# Patient Record
Sex: MALE | Race: WHITE
[De-identification: names, ages, dates, MRNs, and addresses within clinical notes are randomized per-mention and may not be internally consistent; named-entity substitution may affect disease eponyms.]

---

## 2018-03-19 ENCOUNTER — HOSPITAL ENCOUNTER (INPATIENT)
Dept: HOSPITAL 17 - NEPD | Age: 38
LOS: 1 days | Discharge: HOME | DRG: 514 | End: 2018-03-20
Attending: HOSPITALIST | Admitting: HOSPITALIST
Payer: COMMERCIAL

## 2018-03-19 VITALS
RESPIRATION RATE: 19 BRPM | OXYGEN SATURATION: 97 % | SYSTOLIC BLOOD PRESSURE: 148 MMHG | DIASTOLIC BLOOD PRESSURE: 91 MMHG | HEART RATE: 101 BPM | TEMPERATURE: 97.2 F

## 2018-03-19 VITALS
DIASTOLIC BLOOD PRESSURE: 98 MMHG | OXYGEN SATURATION: 98 % | TEMPERATURE: 97.7 F | HEART RATE: 95 BPM | SYSTOLIC BLOOD PRESSURE: 139 MMHG | RESPIRATION RATE: 19 BRPM

## 2018-03-19 VITALS
DIASTOLIC BLOOD PRESSURE: 118 MMHG | OXYGEN SATURATION: 98 % | SYSTOLIC BLOOD PRESSURE: 179 MMHG | HEART RATE: 70 BPM | RESPIRATION RATE: 18 BRPM | TEMPERATURE: 97.6 F

## 2018-03-19 VITALS
OXYGEN SATURATION: 99 % | TEMPERATURE: 98.2 F | SYSTOLIC BLOOD PRESSURE: 176 MMHG | DIASTOLIC BLOOD PRESSURE: 108 MMHG | RESPIRATION RATE: 18 BRPM | HEART RATE: 116 BPM

## 2018-03-19 VITALS
DIASTOLIC BLOOD PRESSURE: 103 MMHG | HEART RATE: 88 BPM | RESPIRATION RATE: 17 BRPM | SYSTOLIC BLOOD PRESSURE: 174 MMHG | TEMPERATURE: 97.4 F | OXYGEN SATURATION: 100 %

## 2018-03-19 VITALS — WEIGHT: 165.35 LBS | HEIGHT: 70 IN | BODY MASS INDEX: 23.67 KG/M2

## 2018-03-19 VITALS
OXYGEN SATURATION: 98 % | DIASTOLIC BLOOD PRESSURE: 96 MMHG | HEART RATE: 105 BPM | RESPIRATION RATE: 17 BRPM | TEMPERATURE: 97.4 F | SYSTOLIC BLOOD PRESSURE: 165 MMHG

## 2018-03-19 VITALS — HEART RATE: 100 BPM | RESPIRATION RATE: 18 BRPM | OXYGEN SATURATION: 97 %
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
## 2018-03-19 DIAGNOSIS — Z23: ICD-10-CM

## 2018-03-19 DIAGNOSIS — F12.90: ICD-10-CM

## 2018-03-19 DIAGNOSIS — I10: ICD-10-CM

## 2018-03-19 DIAGNOSIS — S62.306B: Primary | ICD-10-CM

## 2018-03-19 DIAGNOSIS — R73.9: ICD-10-CM

## 2018-03-19 DIAGNOSIS — R00.0: ICD-10-CM

## 2018-03-19 DIAGNOSIS — G89.29: ICD-10-CM

## 2018-03-19 DIAGNOSIS — M54.5: ICD-10-CM

## 2018-03-19 LAB
BASOPHILS # BLD AUTO: 0.1 TH/MM3 (ref 0–0.2)
BASOPHILS NFR BLD: 0.5 % (ref 0–2)
BUN SERPL-MCNC: 17 MG/DL (ref 7–18)
CALCIUM SERPL-MCNC: 8.4 MG/DL (ref 8.5–10.1)
CHLORIDE SERPL-SCNC: 108 MEQ/L (ref 98–107)
CREAT SERPL-MCNC: 0.93 MG/DL (ref 0.6–1.3)
EOSINOPHIL # BLD: 0.1 TH/MM3 (ref 0–0.4)
EOSINOPHIL NFR BLD: 0.9 % (ref 0–4)
ERYTHROCYTE [DISTWIDTH] IN BLOOD BY AUTOMATED COUNT: 13.9 % (ref 11.6–17.2)
GFR SERPLBLD BASED ON 1.73 SQ M-ARVRAT: 91 ML/MIN (ref 89–?)
GLUCOSE SERPL-MCNC: 91 MG/DL (ref 74–106)
HCO3 BLD-SCNC: 22.8 MEQ/L (ref 21–32)
HCT VFR BLD CALC: 42.4 % (ref 39–51)
HGB BLD-MCNC: 15.2 GM/DL (ref 13–17)
INR PPP: 1 RATIO
LYMPHOCYTES # BLD AUTO: 1.8 TH/MM3 (ref 1–4.8)
LYMPHOCYTES NFR BLD AUTO: 13.4 % (ref 9–44)
MCH RBC QN AUTO: 32.7 PG (ref 27–34)
MCHC RBC AUTO-ENTMCNC: 35.8 % (ref 32–36)
MCV RBC AUTO: 91.5 FL (ref 80–100)
MONOCYTE #: 0.8 TH/MM3 (ref 0–0.9)
MONOCYTES NFR BLD: 6 % (ref 0–8)
NEUTROPHILS # BLD AUTO: 10.4 TH/MM3 (ref 1.8–7.7)
NEUTROPHILS NFR BLD AUTO: 79.2 % (ref 16–70)
PLATELET # BLD: 210 TH/MM3 (ref 150–450)
PMV BLD AUTO: 9.2 FL (ref 7–11)
PROTHROMBIN TIME: 10.1 SEC (ref 9.8–11.6)
RBC # BLD AUTO: 4.63 MIL/MM3 (ref 4.5–5.9)
SODIUM SERPL-SCNC: 142 MEQ/L (ref 136–145)
WBC # BLD AUTO: 13.2 TH/MM3 (ref 4–11)

## 2018-03-19 PROCEDURE — 96365 THER/PROPH/DIAG IV INF INIT: CPT

## 2018-03-19 PROCEDURE — 0PSP04Z REPOSITION RIGHT METACARPAL WITH INTERNAL FIXATION DEVICE, OPEN APPROACH: ICD-10-PCS | Performed by: ORTHOPAEDIC SURGERY

## 2018-03-19 PROCEDURE — 96375 TX/PRO/DX INJ NEW DRUG ADDON: CPT

## 2018-03-19 PROCEDURE — 73120 X-RAY EXAM OF HAND: CPT

## 2018-03-19 PROCEDURE — 90471 IMMUNIZATION ADMIN: CPT

## 2018-03-19 PROCEDURE — 26605 TREAT METACARPAL FRACTURE: CPT

## 2018-03-19 PROCEDURE — 83036 HEMOGLOBIN GLYCOSYLATED A1C: CPT

## 2018-03-19 PROCEDURE — 12001 RPR S/N/AX/GEN/TRNK 2.5CM/<: CPT

## 2018-03-19 PROCEDURE — 0HQFXZZ REPAIR RIGHT HAND SKIN, EXTERNAL APPROACH: ICD-10-PCS

## 2018-03-19 PROCEDURE — C1713 ANCHOR/SCREW BN/BN,TIS/BN: HCPCS

## 2018-03-19 PROCEDURE — 85610 PROTHROMBIN TIME: CPT

## 2018-03-19 PROCEDURE — 0PSPXZZ REPOSITION RIGHT METACARPAL, EXTERNAL APPROACH: ICD-10-PCS

## 2018-03-19 PROCEDURE — 85025 COMPLETE CBC W/AUTO DIFF WBC: CPT

## 2018-03-19 PROCEDURE — 90714 TD VACC NO PRESV 7 YRS+ IM: CPT

## 2018-03-19 PROCEDURE — 85730 THROMBOPLASTIN TIME PARTIAL: CPT

## 2018-03-19 PROCEDURE — 73130 X-RAY EXAM OF HAND: CPT

## 2018-03-19 PROCEDURE — 80048 BASIC METABOLIC PNL TOTAL CA: CPT

## 2018-03-19 RX ADMIN — MORPHINE SULFATE PRN MG: 2 INJECTION, SOLUTION INTRAMUSCULAR; INTRAVENOUS at 21:23

## 2018-03-19 RX ADMIN — MORPHINE SULFATE PRN MG: 2 INJECTION, SOLUTION INTRAMUSCULAR; INTRAVENOUS at 06:29

## 2018-03-19 RX ADMIN — PHENYTOIN SODIUM SCH MLS/HR: 50 INJECTION INTRAMUSCULAR; INTRAVENOUS at 03:31

## 2018-03-19 RX ADMIN — Medication SCH ML: at 09:00

## 2018-03-19 RX ADMIN — PHENYTOIN SODIUM SCH MLS/HR: 50 INJECTION INTRAMUSCULAR; INTRAVENOUS at 13:13

## 2018-03-19 RX ADMIN — MORPHINE SULFATE PRN MG: 2 INJECTION, SOLUTION INTRAMUSCULAR; INTRAVENOUS at 14:56

## 2018-03-19 RX ADMIN — Medication SCH ML: at 21:27
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
## 2018-03-19 NOTE — RADRPT
EXAM DATE/TIME:  03/19/2018 03:24 

 

[facility] COMPARISON:     

HAND RIGHT COMPLETE ([id number]), March 19, 2018, 2:09.

 

                     

INDICATIONS :     

Post reduction 5th digit right hand.

                     

 

MEDICAL HISTORY :     

None.          

 

SURGICAL HISTORY :     

None.   

 

ENCOUNTER:     

Initial                                        

 

ACUITY:     

1 day      

 

PAIN SCORE:     

0/10

 

LOCATION:     

Bilateral  chest

 

FINDINGS:     

Three-view examination of the hand demonstrates a reduction in the amount of angulation of the 5th me
tacarpal fracture.  There is still mild palmar angulation the lateral view.

 

CONCLUSION:     

Reduction in the amount of angulation of the 5th metacarpal fracture.

 

 

 

 Jordin Jennings MD on March 19, 2018 at 3:49           

Board Certified Radiologist.

 This report was verified electronically.
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
## 2018-03-20 VITALS
OXYGEN SATURATION: 98 % | SYSTOLIC BLOOD PRESSURE: 137 MMHG | RESPIRATION RATE: 19 BRPM | HEART RATE: 88 BPM | DIASTOLIC BLOOD PRESSURE: 80 MMHG | TEMPERATURE: 95.9 F

## 2018-03-20 VITALS
SYSTOLIC BLOOD PRESSURE: 173 MMHG | TEMPERATURE: 97.1 F | OXYGEN SATURATION: 99 % | HEART RATE: 78 BPM | DIASTOLIC BLOOD PRESSURE: 113 MMHG | RESPIRATION RATE: 18 BRPM

## 2018-03-20 VITALS
TEMPERATURE: 97.9 F | SYSTOLIC BLOOD PRESSURE: 161 MMHG | HEART RATE: 108 BPM | DIASTOLIC BLOOD PRESSURE: 89 MMHG | RESPIRATION RATE: 17 BRPM | OXYGEN SATURATION: 94 %

## 2018-03-20 LAB
BASOPHILS # BLD AUTO: 0 TH/MM3 (ref 0–0.2)
BASOPHILS NFR BLD: 0.2 % (ref 0–2)
BUN SERPL-MCNC: 15 MG/DL (ref 7–18)
CALCIUM SERPL-MCNC: 8 MG/DL (ref 8.5–10.1)
CHLORIDE SERPL-SCNC: 105 MEQ/L (ref 98–107)
CREAT SERPL-MCNC: 0.93 MG/DL (ref 0.6–1.3)
EOSINOPHIL # BLD: 0 TH/MM3 (ref 0–0.4)
EOSINOPHIL NFR BLD: 0 % (ref 0–4)
ERYTHROCYTE [DISTWIDTH] IN BLOOD BY AUTOMATED COUNT: 14 % (ref 11.6–17.2)
GFR SERPLBLD BASED ON 1.73 SQ M-ARVRAT: 91 ML/MIN (ref 89–?)
GLUCOSE SERPL-MCNC: 198 MG/DL (ref 74–106)
HBA1C MFR BLD: 4.3 % (ref 4.3–6)
HCO3 BLD-SCNC: 25 MEQ/L (ref 21–32)
HCT VFR BLD CALC: 40.9 % (ref 39–51)
HGB BLD-MCNC: 14.9 GM/DL (ref 13–17)
LYMPHOCYTES # BLD AUTO: 0.5 TH/MM3 (ref 1–4.8)
LYMPHOCYTES NFR BLD AUTO: 3.9 % (ref 9–44)
MCH RBC QN AUTO: 33.4 PG (ref 27–34)
MCHC RBC AUTO-ENTMCNC: 36.4 % (ref 32–36)
MCV RBC AUTO: 91.5 FL (ref 80–100)
MONOCYTE #: 0.1 TH/MM3 (ref 0–0.9)
MONOCYTES NFR BLD: 0.9 % (ref 0–8)
NEUTROPHILS # BLD AUTO: 11.5 TH/MM3 (ref 1.8–7.7)
NEUTROPHILS NFR BLD AUTO: 95 % (ref 16–70)
PLATELET # BLD: 179 TH/MM3 (ref 150–450)
PMV BLD AUTO: 9.2 FL (ref 7–11)
RBC # BLD AUTO: 4.46 MIL/MM3 (ref 4.5–5.9)
SODIUM SERPL-SCNC: 138 MEQ/L (ref 136–145)
WBC # BLD AUTO: 12.1 TH/MM3 (ref 4–11)

## 2018-03-20 RX ADMIN — CEFAZOLIN SODIUM SCH MLS/HR: 1 POWDER, FOR SOLUTION INTRAMUSCULAR; INTRAVENOUS at 18:31

## 2018-03-20 RX ADMIN — HYDROCODONE BITARTRATE AND ACETAMINOPHEN PRN TAB: 10; 325 TABLET ORAL at 18:31

## 2018-03-20 RX ADMIN — MORPHINE SULFATE PRN MG: 2 INJECTION, SOLUTION INTRAMUSCULAR; INTRAVENOUS at 07:58

## 2018-03-20 RX ADMIN — Medication SCH ML: at 09:00

## 2018-03-20 RX ADMIN — PHENYTOIN SODIUM SCH MLS/HR: 50 INJECTION INTRAMUSCULAR; INTRAVENOUS at 02:12

## 2018-03-20 RX ADMIN — HYDROCODONE BITARTRATE AND ACETAMINOPHEN PRN TAB: 10; 325 TABLET ORAL at 14:58

## 2018-03-20 RX ADMIN — HYDROCODONE BITARTRATE AND ACETAMINOPHEN PRN TAB: 10; 325 TABLET ORAL at 11:08

## 2018-03-20 RX ADMIN — MORPHINE SULFATE PRN MG: 2 INJECTION, SOLUTION INTRAMUSCULAR; INTRAVENOUS at 02:09

## 2018-03-20 RX ADMIN — CEFAZOLIN SODIUM SCH MLS/HR: 1 POWDER, FOR SOLUTION INTRAMUSCULAR; INTRAVENOUS at 10:13

## 2018-03-20 NOTE — HHI.DCPOC
Discharge Care Plan


Diagnosis:  


(1) Open fracture of fifth metacarpal bone of right hand








Your Health Problems Are: Incision/Drains





 Exercise Tolerance








Goals to Promote Your Health


* To prevent worsening of your condition and complications


* To maintain your health at the optimal level


Directions to Meet Your Goals


*** Take your medications as prescribed


*** Follow your dietary instruction


*** Follow activity as directed








*** Keep your appointments as scheduled


*** Take your immunizations and boosters as scheduled


*** If your symptoms worsen call your PCP, if no PCP go to Urgent Care Center 

or Emergency Room***


*** Smoking is Dangerous to Your Health. Avoid second hand smoke***


***Call the 24-hour hour crisis hotline for domestic abuse at 1-163.394.3575***











Rogerio Kennedy MD Mar 20, 2018 11:10

## 2018-03-20 NOTE — HHI.PR
Subjective


Remarks


Follow-up hand injury.  Upset because he wants to be discharged as soon as 

possible he leaves in [facility].  Discussed with nursing, cleared by 

hand surgery after third dose of IV Ancef tonight.  BP and heart rate elevated 

secondary to agitation





Objective


Vitals





Vital Signs








  Date Time  Temp Pulse Resp B/P (MAP) Pulse Ox O2 Delivery O2 Flow Rate FiO2


 


3/20/18 08:00 95.9 88 19 137/80 (99) 98   


 


3/20/18 03:00 97.9 108 17 161/89 (113) 94   


 


3/20/18 01:30 98.4 109 18 149/76 (100) 97 Room Air  


 


3/20/18 01:15  93 11 134/66 (88) 98 Nasal Cannula 2 


 


3/20/18 01:00  96 12 134/67 (89) 98 Nasal Cannula 2 


 


3/20/18 00:45  104 8 137/67 (90) 98 Nasal Cannula 4 


 


3/20/18 00:40 98.0 104 8 137/69 (91) 97 Nasal Cannula 4 


 


3/19/18 21:15 97.4 88 17 174/103 (126) 100   


 


3/19/18 14:55 97.6 70 18 179/118 (138) 98   


 


3/19/18 11:48 97.7 95 19 139/98 (112) 98   














I/O      


 


 3/19/18 3/19/18 3/19/18 3/20/18 3/20/18 3/20/18





 07:00 15:00 23:00 07:00 15:00 23:00


 


Intake Total 50 ml 0 ml  1214 ml 480 ml 


 


Output Total    10 ml  


 


Balance 50 ml 0 ml  1204 ml 480 ml 


 


      


 


Intake Oral 0 ml 0 ml   480 ml 


 


IV Total 50 ml   414 ml  


 


Other    800 ml  


 


Output Estimated Blood Loss    10 ml  


 


# Voids 0 3   1 


 


# Bowel Movements     0 








Result Diagram:  


3/20/18 0430                                                                   

             3/20/18 0430





Imaging





Last Impressions








Hand X-Ray 3/20/18 0000 Signed





Impressions: 





 Service Date/Time:  Tuesday, March 20, 2018 00:39 - CONCLUSION:  Postoperative 





 internal fixation plate 5th metacarpal.     Jordin Jennings MD 








Objective Remarks


GENERAL: Well-developed and well-nourished


SKIN: Dry with no lesions


CARDIOVASCULAR: Regular rate and rhythm without murmurs, gallops, or rubs. 


RESPIRATORY: Clear to auscultation. Breath sounds equal bilaterally. No wheezes

, rales, or rhonchi.  


GASTROINTESTINAL: Abdomen soft, non-tender, nondistended.  No guarding.


MUSCULOSKELETAL: Extremities without clubbing, cyanosis, or edema. No joint 

tenderness, effusion, or edema noted. No calf tenderness.  Right hand with dry 

dressing


NEUROLOGICAL: Awake and alert. Cranial nerves II through XII intact.  Motor and 

sensory grossly within normal limits. Normal speech.


Procedures


1.  Irrigation and debridement, open fracture including skin, 


subcutaneous tissue, muscle and bone.


2.  Open reduction internal fixation right fifth metacarpal fracture 


using Synthes variable angle locking plate.





A/P


Problem List:  


(1) Open fracture of fifth metacarpal bone of right hand


ICD Code:  S62.306B - Unspecified fracture of fifth metacarpal bone, right hand

, initial encounter for open fracture


Status:  Acute


Assessment and Plan


1.  Compound fracture right hand status post repair.  Continue IV Ancef will 

switch to p.o. Keflex, pain management with IV morphine will start Lortab 

counseled regarding narcotics.  Hand surgery has cleared patient for discharge 

after third dose of IV Ancef tonight


2.  Chronic low back pain/DDD. Patient not currently on any medications


3.  Hyperglycemia.  Monitor check A1c


4.  Hypertension and tachycardia secondary to pain and agitation.  Continue 

pain management


5.  Low risk for DVT


Discharge Planning


Discharge patient to home


Condition on discharge: Improved


Regular Diet as tolerated


[place] activity no driving


Rx written: Lortab and Keflex


Follow-up with primary care physician and hand surgery











Rogerio Kennedy MD Mar 20, 2018 10:12

## 2018-03-20 NOTE — RADRPT
EXAM DATE/TIME:  03/20/2018 00:39 

 

[facility] COMPARISON:     

HAND RIGHT COMPLETE ([id number]), March 19, 2018, 2:09.

 

                     

INDICATIONS :     

Post op. Right hand fifth metacarpal ORIF. 

                     

 

MEDICAL HISTORY :     

None.          

 

SURGICAL HISTORY :     

None.   

 

ENCOUNTER:     

Initial                                        

 

ACUITY:     

1 day      

 

PAIN SCORE:     

Non-responsive.

 

LOCATION:     

Right  hand. 

 

FINDINGS:     

3 views of the right hand were performed in a fiberglass splint after placement of an internal fixati
on plate in the 5th metacarpal.  There is anatomic alignment at the fracture line.  The hardware is i
ntact.  No radiopaque foreign bodies.  Moderate dorsal soft tissue swelling and gas.

 

CONCLUSION:     

Postoperative internal fixation plate 5th metacarpal.

 

 

 

 Jordin Jennings MD on March 20, 2018 at 1:05           

Board Certified Radiologist.

 This report was verified electronically.

## 2018-03-20 NOTE — PD.ORT.PN
Subjective


Subjective Remarks


Patient reports pain controlled in PACU.





Objective


Vitals





Vital Signs








  Date Time  Temp Pulse Resp B/P (MAP) Pulse Ox O2 Delivery O2 Flow Rate FiO2


 


3/19/18 14:55 97.6 70 18 179/118 (138) 98   


 


3/19/18 11:48 97.7 95 19 139/98 (112) 98   


 


3/19/18 07:45 97.2 101 19 148/91 (110) 97   


 


3/19/18 04:05 97.4 105 17 165/96 (119) 98   


 


3/19/18 03:35  100 18  97 Room Air  


 


3/19/18 03:27   18     


 


3/19/18 01:48 98.2 116 18 176/108 (130) 99   














I/O      


 


 3/19/18 3/19/18 3/19/18 3/20/18 3/20/18 3/20/18





 07:00 15:00 23:00 07:00 15:00 23:00


 


Intake Total 50 ml 0 ml  800 ml  


 


Output Total    10 ml  


 


Balance 50 ml 0 ml  790 ml  


 


      


 


Intake Oral 0 ml 0 ml    


 


IV Total 50 ml     


 


Other    800 ml  


 


Output Estimated Blood Loss    10 ml  


 


# Voids 0 3    








Result Diagram:  


3/19/18 0300                                                                   

             3/19/18 0300





Other Results





Laboratory Tests








Test


  3/19/18


03:00


 


Prothromb Time International


Ratio 1.0 RATIO 


 


 


Prothrombin Time


  10.1 SEC


(9.8-11.6)








Imaging





Last 24 hours Impressions








Hand X-Ray 3/19/18 0155 Signed





Impressions: 





 Service Date/Time:  Monday, March 19, 2018 02:09 - CONCLUSION:  Angulated 





 oblique fracture of the distal shaft of the 5th metacarpal bone.     Jordin Jennings MD 








Objective Remarks


Splint in place, <2 sec capillary refill





Assessment & Plan


Assessment and Plan


37yM POD0 s/p I&D right hand ORIF right 5th metacarpal


-Admit for 48hrs Ab, dc on oral antibiotics


-Elevate hand


-Nonweightbearing right hand


-I am happy to followup with patient or he should obtain followup in [place] 

with hand surgeon within 1-2 weeks of returning home for prompt OT for range of 

motion











Viji Robin MD Mar 20, 2018 00:39

## 2018-03-20 NOTE — MB
cc:

Viji Robin MD

****

 

 

DATE OF CONSULT:

03/19/2018

 

REASON FOR CONSULTATION:

Open right fifth metacarpal fracture, displaced.

 

HISTORY OF PRESENT ILLNESS:

Scott Bowser is a 37-year-old right-hand dominant male who presents 

after punching a car.  He underwent reduction of the fracture in the 

emergency room and I was consulted for evaluation.  The patient was 

admitted for IV antibiotics. He reports prior right hand fractures treated 
conservatively. He denies paresthesias.

 

PAST MEDICAL HISTORY:

Back pain.

 

PAST SURGICAL HISTORY:

None known.

 

MEDICATIONS:

None.

 

ALLERGIES:

NO KNOWN DRUG ALLERGIES.

 

SOCIAL HISTORY:

Reports marijuana use.  Denies tobacco or drug use.

 

PHYSICAL EXAMINATION:

GENERAL:  The patient is alert and oriented.

MUSCULOSKELETAL:  He has small open wound over the right fifth metacarpal MCP 

joint as well as the metacarpal.  Sensation intact in the median, 

ulnar, and radial distribution.  Less than 2 second capillary refill 

to the fingers.  Moderate swelling over the hand.  Compartments are 

soft and compressible.  Malrotation of the right fifth metacarpal.

 

IMAGING:

X-rays show an open displaced fifth metacarpal fracture.

 

ASSESSMENT AND PLAN:

A 37-year-old right-hand dominant male with an open displaced right 

fifth metacarpal fracture.

 

PLAN:

Treatment options were discussed with the patient.  He elected to 

proceed with surgical intervention at the earliest available time.  

Risks were discussed which include wound complications, infection, 

nonunion, malunion, need for additional surgery, stiffness, pain and 

he elected to proceed.

 

 

__________________________________

MD KERRI Menendez/LAUREN

D: 03/20/2018, 12:24 AM

T: 03/20/2018, 08:18 AM

Visit #: [id number]

Job #: [id number]

SHAHRAM

## 2018-03-20 NOTE — MP
cc:

Viji Robin MD

****

 

 

DATE OF OPERATION:

03/19/2018

 

 

PREOPERATIVE DIAGNOSIS:

Open displaced right fifth metacarpal fracture.

 

POSTOPERATIVE DIAGNOSIS:

Open displaced right fifth metacarpal fracture.

 

PROCEDURE:

1.  Irrigation and debridement, open fracture including skin, 

subcutaneous tissue, muscle and bone.

2.  Open reduction internal fixation right fifth metacarpal fracture 

using Synthes variable angle locking plate.

 

SURGEON:

Viji Robin MD

 

ANESTHESIA:

General and local.

 

TOURNIQUET TIME:

63 minutes at 250 mmHg.

 

IMPLANTS:

One Synthes variable angle locking plate.

 

INDICATIONS FOR PROCEDURE:

Scott Bowser is a 37-year-old male who sustained a poke hole open right

fifth metacarpal fracture that was displaced after punching a car 

earlier this morning.  He presented to the hospital for evaluation.  

The patient states he has injured the right hand before, but has not 

had any surgery on the right hand.  He denies any paresthesias.  He 

reports pain over the right fifth metacarpal.  He is right hand 

dominant.  He elected to proceed with surgical intervention.  Risks 

were explained not limited to wound complications, infection, 

stiffness, pain, malunion, nonunion, need for hardware removal and he 

elected to proceed.  The patient does live in [facility] and it 

was discussed with the patient the importance of followup with a hand surgeon 
and followup

for therapy to prevent stiffness.

 

DESCRIPTION OF THE PROCEDURE:

The patient was identified in the preoperative holding area and the 

correct extremity was marked.  He was taken to the operating room, 

anesthesia was induced.  The right upper extremity was prepped and 

draped in a normal sterile fashion.  A dorsal incision was made over 

the right fifth metacarpal.  The open wounds were irrigated with 

antibiotic saline and debrided.  The bone was debrided using rongeurs 

and curettes.  There was no gross contamination.  Decision was made to

perform open reduction internal fixation with a Synthes plate.  

Initially, 1 lag screw was placed and then 2 locking screws, 2 

nonlocking screws, followed by multiple locking screws.  This was 

confirmed under fluoroscopy to be in good alignment.  Tourniquet was 

released.  Hemostasis was obtained.  There was less than 2-second 

capillary refill to the fingers.  The fascia was sutured over the plate with a 
PDS.  

The extensor tendon was found to be intact.

 

Following the procedure, the patient did not have malrotation of the 

finger.  The skin was closed with Monocryl and nylon.  Approximately 

10 mL of 2% lidocaine without epinephrine was used for local anesthesia. 

The patient was placed into a splint.  The patient was educated to 

elevate the hand.  He may remain in the hospital for IV antibiotics.  

Again, the patient was advised to have close up with a hand surgeon in

[facility] as well as occupational therapy for range of motion 

within the next week.

 

 

 

 

__________________________________

MD KERRI Menendez/ASHLEY

D: 03/20/2018, 12:21 AM

T: 03/20/2018, 08:23 AM

Visit #: [id number]

Job #: [id number]

[email or web address]